# Patient Record
Sex: FEMALE | Race: WHITE | NOT HISPANIC OR LATINO | URBAN - METROPOLITAN AREA
[De-identification: names, ages, dates, MRNs, and addresses within clinical notes are randomized per-mention and may not be internally consistent; named-entity substitution may affect disease eponyms.]

---

## 2019-08-22 ENCOUNTER — INPATIENT (INPATIENT)
Facility: HOSPITAL | Age: 24
LOS: 0 days | Discharge: HOME | DRG: 101 | End: 2019-08-23
Attending: INTERNAL MEDICINE | Admitting: INTERNAL MEDICINE
Payer: COMMERCIAL

## 2019-08-22 VITALS
HEART RATE: 89 BPM | TEMPERATURE: 98 F | OXYGEN SATURATION: 96 % | WEIGHT: 149.91 LBS | SYSTOLIC BLOOD PRESSURE: 138 MMHG | RESPIRATION RATE: 18 BRPM | DIASTOLIC BLOOD PRESSURE: 89 MMHG

## 2019-08-22 LAB
ALBUMIN SERPL ELPH-MCNC: 4.7 G/DL — SIGNIFICANT CHANGE UP (ref 3.4–5)
ALP SERPL-CCNC: 74 U/L — SIGNIFICANT CHANGE UP (ref 40–120)
ALT FLD-CCNC: 48 U/L — HIGH (ref 12–42)
ANION GAP SERPL CALC-SCNC: 10 MMOL/L — SIGNIFICANT CHANGE UP (ref 9–16)
APPEARANCE UR: CLEAR — SIGNIFICANT CHANGE UP
AST SERPL-CCNC: 37 U/L — SIGNIFICANT CHANGE UP (ref 15–37)
BASOPHILS NFR BLD AUTO: 1 % — SIGNIFICANT CHANGE UP (ref 0–2)
BILIRUB SERPL-MCNC: 0.6 MG/DL — SIGNIFICANT CHANGE UP (ref 0.2–1.2)
BILIRUB UR-MCNC: NEGATIVE — SIGNIFICANT CHANGE UP
BUN SERPL-MCNC: 15 MG/DL — SIGNIFICANT CHANGE UP (ref 7–23)
CALCIUM SERPL-MCNC: 9.6 MG/DL — SIGNIFICANT CHANGE UP (ref 8.5–10.5)
CHLORIDE SERPL-SCNC: 104 MMOL/L — SIGNIFICANT CHANGE UP (ref 96–108)
CO2 SERPL-SCNC: 27 MMOL/L — SIGNIFICANT CHANGE UP (ref 22–31)
COLOR SPEC: YELLOW — SIGNIFICANT CHANGE UP
CREAT SERPL-MCNC: 0.89 MG/DL — SIGNIFICANT CHANGE UP (ref 0.5–1.3)
DIFF PNL FLD: NEGATIVE — SIGNIFICANT CHANGE UP
EOSINOPHIL NFR BLD AUTO: 1.3 % — SIGNIFICANT CHANGE UP (ref 0–6)
GLUCOSE SERPL-MCNC: 90 MG/DL — SIGNIFICANT CHANGE UP (ref 70–99)
GLUCOSE UR QL: NEGATIVE — SIGNIFICANT CHANGE UP
HCG UR QL: NEGATIVE — SIGNIFICANT CHANGE UP
HCT VFR BLD CALC: 41.5 % — SIGNIFICANT CHANGE UP (ref 34.5–45)
HGB BLD-MCNC: 14.6 G/DL — SIGNIFICANT CHANGE UP (ref 11.5–15.5)
IMM GRANULOCYTES NFR BLD AUTO: 0.3 % — SIGNIFICANT CHANGE UP (ref 0–1.5)
KETONES UR-MCNC: NEGATIVE — SIGNIFICANT CHANGE UP
LEUKOCYTE ESTERASE UR-ACNC: NEGATIVE — SIGNIFICANT CHANGE UP
LYMPHOCYTES # BLD AUTO: 29.8 % — SIGNIFICANT CHANGE UP (ref 13–44)
MCHC RBC-ENTMCNC: 34.1 PG — HIGH (ref 27–34)
MCHC RBC-ENTMCNC: 35.2 G/DL — SIGNIFICANT CHANGE UP (ref 32–36)
MCV RBC AUTO: 97 FL — SIGNIFICANT CHANGE UP (ref 80–100)
MONOCYTES NFR BLD AUTO: 9 % — SIGNIFICANT CHANGE UP (ref 2–14)
NEUTROPHILS NFR BLD AUTO: 58.6 % — SIGNIFICANT CHANGE UP (ref 43–77)
NITRITE UR-MCNC: NEGATIVE — SIGNIFICANT CHANGE UP
PCP SPEC-MCNC: SIGNIFICANT CHANGE UP
PH UR: 6 — SIGNIFICANT CHANGE UP (ref 5–8)
PLATELET # BLD AUTO: 235 K/UL — SIGNIFICANT CHANGE UP (ref 150–400)
POTASSIUM SERPL-MCNC: 3.4 MMOL/L — LOW (ref 3.5–5.3)
POTASSIUM SERPL-SCNC: 3.4 MMOL/L — LOW (ref 3.5–5.3)
PROT SERPL-MCNC: 8.1 G/DL — SIGNIFICANT CHANGE UP (ref 6.4–8.2)
PROT UR-MCNC: 30 MG/DL
RBC # BLD: 4.28 M/UL — SIGNIFICANT CHANGE UP (ref 3.8–5.2)
RBC # FLD: 11.9 % — SIGNIFICANT CHANGE UP (ref 10.3–14.5)
SODIUM SERPL-SCNC: 141 MMOL/L — SIGNIFICANT CHANGE UP (ref 132–145)
SP GR SPEC: 1.02 — SIGNIFICANT CHANGE UP (ref 1–1.03)
UROBILINOGEN FLD QL: 0.2 E.U./DL — SIGNIFICANT CHANGE UP
WBC # BLD: 6 K/UL — SIGNIFICANT CHANGE UP (ref 3.8–10.5)
WBC # FLD AUTO: 6 K/UL — SIGNIFICANT CHANGE UP (ref 3.8–10.5)

## 2019-08-22 PROCEDURE — 99285 EMERGENCY DEPT VISIT HI MDM: CPT

## 2019-08-22 PROCEDURE — 70450 CT HEAD/BRAIN W/O DYE: CPT | Mod: 26

## 2019-08-22 PROCEDURE — 93010 ELECTROCARDIOGRAM REPORT: CPT | Mod: NC

## 2019-08-22 RX ORDER — POTASSIUM CHLORIDE 20 MEQ
40 PACKET (EA) ORAL ONCE
Refills: 0 | Status: COMPLETED | OUTPATIENT
Start: 2019-08-22 | End: 2019-08-22

## 2019-08-22 RX ORDER — SODIUM CHLORIDE 9 MG/ML
1000 INJECTION INTRAMUSCULAR; INTRAVENOUS; SUBCUTANEOUS
Refills: 0 | Status: DISCONTINUED | OUTPATIENT
Start: 2019-08-22 | End: 2019-08-23

## 2019-08-22 RX ADMIN — Medication 50 MILLIGRAM(S): at 19:23

## 2019-08-22 RX ADMIN — Medication 0.5 MILLIGRAM(S): at 19:23

## 2019-08-22 RX ADMIN — Medication 40 MILLIEQUIVALENT(S): at 20:12

## 2019-08-22 RX ADMIN — Medication 0.5 MILLIGRAM(S): at 21:18

## 2019-08-22 RX ADMIN — SODIUM CHLORIDE 100 MILLILITER(S): 9 INJECTION INTRAMUSCULAR; INTRAVENOUS; SUBCUTANEOUS at 21:19

## 2019-08-22 NOTE — ED PROVIDER NOTE - OBJECTIVE STATEMENT
24 y/o Female with a PMHx of anxiety and depression on Zoloft presents the ED for a seizure. As per patient's sister who witnessed the seizure, pt was sitting on the couch at their cousin's house and started to lean and make groaning sounds, pt was red then turned blue. 911 was immediately called. Pt then sat up and looked confused with difficulty speaking for approximately 10 minutes. Pt's sister is not sure of how long the seizure lasted but states pt did not hit her head and was on the couch the entire time. Pt recently stopped taking Lorazepam 1mg once/day, which she takes for anxiety, and completed her tapering regimen (Lorazepam .5mg for 1-2 weeks) 4 days ago. Pt does not remember any thing but does remember feeling lightheaded and anxious prior to arriving her cousin's house. Of note, she has not been sleeping well for the last 2 days due to her job. She currently has a headache to the back of her head, feels anxious, and tongue pain. Denies urinary and bowel incontinence, visual and auditory hallucinations, and N/V/D, It was agreed by the patient and her psychiatrist to discontinue Lorazepam 2-3 weeks ago. She has done this in the past where she would take Lorazepam for 5 months and discontinue for a period time before starting Lorazepam again, she states feels long term use clouds her memory. Pt occasionally vapes and drinks approx 3x/week. When she drinks, she usually drinks 3 drinks but more on the weekends. Last alcoholic drink was yesterday evening where she drank 2 glasses of wine. Denies illicit drug use but does endorse eating a marijuana cookie 3 weeks ago. Pt denies a history of seizures. 24 y/o Female with a PMHx of anxiety and depression on Zoloft presents the ED for a seizure. As per patient's sister who witnessed the seizure, pt was sitting on the couch at their cousin's house and started to lean to one side and make groaning sounds, this was followed by GTC activity, pt was red then turned blue. 911 was immediately called. Pt then sat up and looked confused with difficulty speaking for approximately 10 minutes. Pt's sister is not sure of how long the seizure lasted but states pt did not hit her head and was on the couch the entire time. Pt recently stopped taking Lorazepam 1mg once/day, which she takes for anxiety, and completed her tapering regimen (Lorazepam .5mg for 1-2 weeks) 4 days ago.  Pt does not remember any thing but does remember feeling lightheaded and anxious prior to arriving her cousin's house. Of note, she has not been sleeping well for the last 2 days due to her job. She currently has a headache to the back of her head, feels anxious, and tongue pain. Denies urinary and bowel incontinence, visual and auditory hallucinations, and N/V/D, It was agreed by the patient and her psychiatrist to discontinue Lorazepam 2-3 weeks ago. She has done this in the past where she would take Lorazepam for 5 months and discontinue for a period time before starting Lorazepam again, she states feels long term use clouds her memory. Pt occasionally vapes and drinks approx 3x/week. When she drinks, she usually drinks 3 drinks but more on the weekends. Last alcoholic drink was yesterday evening where she drank 2 glasses of wine. Denies illicit drug use but does endorse eating a marijuana cookie 3 weeks ago. Pt denies a history of seizures.

## 2019-08-22 NOTE — ED PROVIDER NOTE - PHYSICAL EXAMINATION
VITAL SIGNS: I have reviewed nursing notes and confirm.  CONSTITUTIONAL: Well-developed; well-nourished; in no acute distress.  SKIN: Skin is warm and dry, no acute rash.  HEAD: Normocephalic; atraumatic.  EYES: PERRL, EOM intact; conjunctiva and sclera clear.  ENMT: No nasal discharge; airway clear. Bilateral tongue bites  NECK: Supple; non tender.  CARD: S1, S2 normal; no murmurs, gallops, or rubs. Regular rate and rhythm.  RESP: No wheezes, rales or rhonchi.  ABD: Normal bowel sounds; soft; non-distended; non-tender; no hepatosplenomegaly.  EXT: Normal ROM. No clubbing, cyanosis or edema.  NEURO: Alert, oriented. Grossly unremarkable. No pronator drift. PERRL. Speaking in full sentences.   PSYCH: Cooperative, appropriate. VITAL SIGNS: I have reviewed nursing notes and confirm.  CONSTITUTIONAL: Well-developed; well-nourished; in no acute distress.  SKIN: Skin is warm and dry, no acute rash.  HEAD: Normocephalic; atraumatic.  EYES: PERRL, EOM intact; conjunctiva and sclera clear.  ENMT: No nasal discharge; airway clear. Bilateral tongue bites and mild tongue fasciculations.   NECK: Supple; non tender.  CARD: S1, S2 normal; no murmurs, gallops, or rubs. Regular rate and rhythm.  RESP: No wheezes, rales or rhonchi.  ABD: Normal bowel sounds; soft; non-distended; non-tender; no hepatosplenomegaly.  EXT: Normal ROM. No clubbing, cyanosis or edema.  NEURO: Alert, oriented. Grossly unremarkable. No pronator drift. PERRL. Speaking in full sentences. Mild tremors.   PSYCH: Cooperative, anxious.

## 2019-08-22 NOTE — ED ADULT NURSE REASSESSMENT NOTE - NS ED NURSE REASSESS COMMENT FT1
Pt care handed over to myself, introduced self to patient and sister tia, pt gcs 15, calm and compliant, awaiting results of cat scan, sister states pt sitting on couch went rigid, fell to the side, bit her tongue and cheek "convulsing" lasting "about a minute", nil seizure activity in past, had been taking 1mg po lorazepam for 5 minths daily stopped abruptly 1 week ago, increased alcohol intake over past week

## 2019-08-22 NOTE — ED PROVIDER NOTE - CARE PLAN
Principal Discharge DX:	New onset seizure Principal Discharge DX:	New onset seizure  Secondary Diagnosis:	Benzodiazepine withdrawal

## 2019-08-22 NOTE — ED ADULT NURSE NOTE - NSIMPLEMENTINTERV_GEN_ALL_ED
Implemented All Universal Safety Interventions:  [place] to call system. Call bell, personal items and telephone within reach. Instruct patient to call for assistance. Room bathroom lighting operational. Non-slip footwear when patient is off stretcher. Physically safe environment: no spills, clutter or unnecessary equipment. Stretcher in lowest position, wheels locked, appropriate side rails in place.

## 2019-08-22 NOTE — ED PROVIDER NOTE - PROGRESS NOTE DETAILS
Pt is feeling better.  Current CIWA is ~7.  Will consult MICU at [facility] given high risk dx of benzo w/d.  Will admit as per their recommendations. Case discussed with Dr. bowles of MICU.  He accepts pt to medical step down for close monitoring overnight.  Recommends additional ativan and maintenance fluid.

## 2019-08-22 NOTE — ED ADULT NURSE NOTE - OBJECTIVE STATEMENT
witnessed seizure activity (first) while sitting on the sofa at a families member home, no injuries, pt tearful, no s/s of distress, sister at bedside

## 2019-08-22 NOTE — ED PROVIDER NOTE - CLINICAL SUMMARY MEDICAL DECISION MAKING FREE TEXT BOX
24 y/o Female with a PMHx of depression and anxiety on Zoloft here for new onset of seizure witnessed by her sister. Pt recently discontinued Lorazepam 4 days ago which was agreed upon by pt and her psychiatrist. Will order labs, head CT, IV Lorazepam, Librium, and EKG. Will reassess. 24 y/o Female with a PMHx of depression and anxiety on Zoloft here for new onset of seizure witnessed by her sister. Pt recently discontinued Lorazepam 4 days ago which was agreed upon by pt and her psychiatrist. Will order labs, head CT, IV Lorazepam, Librium, and EKG. Will reassess.  Initial CIWA is ~11.

## 2019-08-22 NOTE — ED ADULT TRIAGE NOTE - CHIEF COMPLAINT QUOTE
here for witnessed seizure with no injury. Pt admits to recently stopping her lorazepam which she has been taking for the last 5 months. Pt arrives A+Ox3 and also admits to drinking frequently. Pt with h/o anxiety denies any seizure history

## 2019-08-23 ENCOUNTER — TRANSCRIPTION ENCOUNTER (OUTPATIENT)
Age: 24
End: 2019-08-23

## 2019-08-23 VITALS — TEMPERATURE: 98 F

## 2019-08-23 DIAGNOSIS — F10.239 ALCOHOL DEPENDENCE WITH WITHDRAWAL, UNSPECIFIED: ICD-10-CM

## 2019-08-23 DIAGNOSIS — K08.409 PARTIAL LOSS OF TEETH, UNSPECIFIED CAUSE, UNSPECIFIED CLASS: Chronic | ICD-10-CM

## 2019-08-23 DIAGNOSIS — Z91.89 OTHER SPECIFIED PERSONAL RISK FACTORS, NOT ELSEWHERE CLASSIFIED: ICD-10-CM

## 2019-08-23 DIAGNOSIS — F41.9 ANXIETY DISORDER, UNSPECIFIED: ICD-10-CM

## 2019-08-23 DIAGNOSIS — R56.9 UNSPECIFIED CONVULSIONS: ICD-10-CM

## 2019-08-23 DIAGNOSIS — R63.8 OTHER SYMPTOMS AND SIGNS CONCERNING FOOD AND FLUID INTAKE: ICD-10-CM

## 2019-08-23 DIAGNOSIS — F32.9 MAJOR DEPRESSIVE DISORDER, SINGLE EPISODE, UNSPECIFIED: ICD-10-CM

## 2019-08-23 PROBLEM — Z00.00 ENCOUNTER FOR PREVENTIVE HEALTH EXAMINATION: Status: ACTIVE | Noted: 2019-08-23

## 2019-08-23 LAB
ANION GAP SERPL CALC-SCNC: 9 MMOL/L — SIGNIFICANT CHANGE UP (ref 5–17)
BUN SERPL-MCNC: 13 MG/DL — SIGNIFICANT CHANGE UP (ref 7–23)
CALCIUM SERPL-MCNC: 8.3 MG/DL — LOW (ref 8.4–10.5)
CHLORIDE SERPL-SCNC: 108 MMOL/L — SIGNIFICANT CHANGE UP (ref 96–108)
CO2 SERPL-SCNC: 23 MMOL/L — SIGNIFICANT CHANGE UP (ref 22–31)
CREAT SERPL-MCNC: 0.84 MG/DL — SIGNIFICANT CHANGE UP (ref 0.5–1.3)
GLUCOSE SERPL-MCNC: 89 MG/DL — SIGNIFICANT CHANGE UP (ref 70–99)
HCT VFR BLD CALC: 38.7 % — SIGNIFICANT CHANGE UP (ref 34.5–45)
HGB BLD-MCNC: 12.7 G/DL — SIGNIFICANT CHANGE UP (ref 11.5–15.5)
MAGNESIUM SERPL-MCNC: 1.9 MG/DL — SIGNIFICANT CHANGE UP (ref 1.6–2.6)
MCHC RBC-ENTMCNC: 32.8 GM/DL — SIGNIFICANT CHANGE UP (ref 32–36)
MCHC RBC-ENTMCNC: 33.4 PG — SIGNIFICANT CHANGE UP (ref 27–34)
MCV RBC AUTO: 101.8 FL — HIGH (ref 80–100)
NRBC # BLD: 0 /100 WBCS — SIGNIFICANT CHANGE UP (ref 0–0)
PLATELET # BLD AUTO: 211 K/UL — SIGNIFICANT CHANGE UP (ref 150–400)
POTASSIUM SERPL-MCNC: 4.1 MMOL/L — SIGNIFICANT CHANGE UP (ref 3.5–5.3)
POTASSIUM SERPL-SCNC: 4.1 MMOL/L — SIGNIFICANT CHANGE UP (ref 3.5–5.3)
RBC # BLD: 3.8 M/UL — SIGNIFICANT CHANGE UP (ref 3.8–5.2)
RBC # FLD: 12 % — SIGNIFICANT CHANGE UP (ref 10.3–14.5)
SODIUM SERPL-SCNC: 140 MMOL/L — SIGNIFICANT CHANGE UP (ref 135–145)
WBC # BLD: 8.87 K/UL — SIGNIFICANT CHANGE UP (ref 3.8–10.5)
WBC # FLD AUTO: 8.87 K/UL — SIGNIFICANT CHANGE UP (ref 3.8–10.5)

## 2019-08-23 PROCEDURE — 99222 1ST HOSP IP/OBS MODERATE 55: CPT

## 2019-08-23 PROCEDURE — 85027 COMPLETE CBC AUTOMATED: CPT

## 2019-08-23 PROCEDURE — 82962 GLUCOSE BLOOD TEST: CPT

## 2019-08-23 PROCEDURE — 80307 DRUG TEST PRSMV CHEM ANLYZR: CPT

## 2019-08-23 PROCEDURE — 85025 COMPLETE CBC W/AUTO DIFF WBC: CPT

## 2019-08-23 PROCEDURE — 99285 EMERGENCY DEPT VISIT HI MDM: CPT | Mod: 25

## 2019-08-23 PROCEDURE — 80048 BASIC METABOLIC PNL TOTAL CA: CPT

## 2019-08-23 PROCEDURE — 96374 THER/PROPH/DIAG INJ IV PUSH: CPT

## 2019-08-23 PROCEDURE — G0378: CPT

## 2019-08-23 PROCEDURE — 83735 ASSAY OF MAGNESIUM: CPT

## 2019-08-23 PROCEDURE — 93005 ELECTROCARDIOGRAM TRACING: CPT

## 2019-08-23 PROCEDURE — 96376 TX/PRO/DX INJ SAME DRUG ADON: CPT

## 2019-08-23 PROCEDURE — 70450 CT HEAD/BRAIN W/O DYE: CPT

## 2019-08-23 PROCEDURE — 95813 EEG EXTND MNTR 61-119 MIN: CPT | Mod: 26

## 2019-08-23 PROCEDURE — 81025 URINE PREGNANCY TEST: CPT

## 2019-08-23 PROCEDURE — 80053 COMPREHEN METABOLIC PANEL: CPT

## 2019-08-23 PROCEDURE — 36415 COLL VENOUS BLD VENIPUNCTURE: CPT

## 2019-08-23 PROCEDURE — 81001 URINALYSIS AUTO W/SCOPE: CPT

## 2019-08-23 PROCEDURE — 95819 EEG AWAKE AND ASLEEP: CPT

## 2019-08-23 RX ORDER — THIAMINE MONONITRATE (VIT B1) 100 MG
1 TABLET ORAL
Qty: 30 | Refills: 0
Start: 2019-08-23 | End: 2019-09-21

## 2019-08-23 RX ORDER — THIAMINE MONONITRATE (VIT B1) 100 MG
1 TABLET ORAL
Qty: 0 | Refills: 0 | DISCHARGE
Start: 2019-08-23

## 2019-08-23 RX ORDER — FOLIC ACID 0.8 MG
1 TABLET ORAL
Qty: 30 | Refills: 0
Start: 2019-08-23 | End: 2019-09-21

## 2019-08-23 RX ORDER — FOLIC ACID 0.8 MG
1 TABLET ORAL DAILY
Refills: 0 | Status: DISCONTINUED | OUTPATIENT
Start: 2019-08-23 | End: 2019-08-23

## 2019-08-23 RX ORDER — SERTRALINE 25 MG/1
150 TABLET, FILM COATED ORAL
Qty: 0 | Refills: 0 | DISCHARGE

## 2019-08-23 RX ORDER — ACETAMINOPHEN 500 MG
650 TABLET ORAL EVERY 6 HOURS
Refills: 0 | Status: DISCONTINUED | OUTPATIENT
Start: 2019-08-23 | End: 2019-08-23

## 2019-08-23 RX ORDER — SODIUM CHLORIDE 9 MG/ML
1000 INJECTION INTRAMUSCULAR; INTRAVENOUS; SUBCUTANEOUS
Refills: 0 | Status: DISCONTINUED | OUTPATIENT
Start: 2019-08-23 | End: 2019-08-23

## 2019-08-23 RX ORDER — THIAMINE MONONITRATE (VIT B1) 100 MG
100 TABLET ORAL DAILY
Refills: 0 | Status: DISCONTINUED | OUTPATIENT
Start: 2019-08-23 | End: 2019-08-23

## 2019-08-23 RX ORDER — FOLIC ACID 0.8 MG
1 TABLET ORAL
Qty: 0 | Refills: 0 | DISCHARGE
Start: 2019-08-23

## 2019-08-23 RX ADMIN — SODIUM CHLORIDE 100 MILLILITER(S): 9 INJECTION INTRAMUSCULAR; INTRAVENOUS; SUBCUTANEOUS at 10:57

## 2019-08-23 RX ADMIN — Medication 1 MILLIGRAM(S): at 12:06

## 2019-08-23 RX ADMIN — Medication 650 MILLIGRAM(S): at 00:25

## 2019-08-23 RX ADMIN — Medication 650 MILLIGRAM(S): at 13:32

## 2019-08-23 RX ADMIN — Medication 1 TABLET(S): at 12:06

## 2019-08-23 RX ADMIN — Medication 0.5 MILLIGRAM(S): at 12:06

## 2019-08-23 RX ADMIN — Medication 650 MILLIGRAM(S): at 01:00

## 2019-08-23 RX ADMIN — Medication 650 MILLIGRAM(S): at 13:02

## 2019-08-23 RX ADMIN — Medication 100 MILLIGRAM(S): at 12:06

## 2019-08-23 NOTE — PROGRESS NOTE ADULT - SUBJECTIVE AND OBJECTIVE BOX
*** INCOMPLETE ***    OVERNIGHT EVENTS: As per overnight staff, there were no acute events overnight    SUBJECTIVE / INTERVAL HPI: Patient seen and examined at bedside. Patient resting comfortably, no complaints at this time. Patient denies: fever, chills, weakness, dizziness, headaches, changes in vision, chest pain, palpitations, shortness of breath, cough, N/V, diarrhea or constipation, dysuria, LE edema. ROS otherwise negative.      VITAL SIGNS:  Vital Signs Last 24 Hrs  T(C): 36.4 (23 Aug 2019 10:40), Max: 36.9 (22 Aug 2019 18:43)  T(F): 97.6 (23 Aug 2019 10:40), Max: 98.5 (22 Aug 2019 18:43)  HR: 54 (23 Aug 2019 08:46) (50 - 89)  BP: 91/53 (23 Aug 2019 08:46) (91/53 - 138/89)  BP(mean): 66 (23 Aug 2019 08:46) (66 - 88)  RR: 17 (23 Aug 2019 08:46) (16 - 18)  SpO2: 98% (23 Aug 2019 08:46) (96% - 100%)      19 @ 07:01  -  19 @ 11:22  --------------------------------------------------------  IN: 400 mL / OUT: 0 mL / NET: 400 mL      PHYSICAL EXAM:  General: well-nourished, comfortable in bed, NAD  Neurological: AAOx3, no focal deficits, answering questions appropriately  HEENT: NC/AT; EOMI, anicteric sclera, no discharge or exudate from eyes or nose; MMM  Neck: supple  Cardiovascular: +S1/S2, no murmurs/rubs/gallops, RRR  Respiratory: no increased work of breathing, CTA B/L, no diminished breath sounds, no accessory muscle use  Gastrointestinal: soft, NT/ND; active BSx4 quadrants  Genitourinary: no suprapubic tenderness, no CVA tenderness  Extremities: WWP; no edema  Skin: multiple well-healed tattoos      MEDICATIONS:  MEDICATIONS  (STANDING):  folic acid 1 milliGRAM(s) Oral daily  LORazepam     Tablet 0.5 milliGRAM(s) Oral daily  multivitamin 1 Tablet(s) Oral daily  sodium chloride 0.9%. 1000 milliLiter(s) (100 mL/Hr) IV Continuous <Continuous>  thiamine 100 milliGRAM(s) Oral daily    MEDICATIONS  (PRN):  acetaminophen    Suspension .. 650 milliGRAM(s) Oral every 6 hours PRN Mild Pain (1 - 3)  LORazepam   Injectable 1 milliGRAM(s) IV Push every 4 hours PRN seizure      ALLERGIES/INTOLERANCES:  Allergies  No Known Allergies    Intolerances      LABS:                        12.7   8.87  )-----------( 211      ( 23 Aug 2019 05:36 )             38.7     08-    140  |  108  |  13  ----------------------------<  89  4.1   |  23  |  0.84    Ca    8.3<L>      23 Aug 2019 05:36  Mg     1.9         TPro  8.1  /  Alb  4.7  /  TBili  0.6  /  DBili  x   /  AST  37  /  ALT  48<H>  /  AlkPhos  74        Urinalysis Basic - ( 22 Aug 2019 19:45 )    Color: Yellow / Appearance: Clear / S.025 / pH: x  Gluc: x / Ketone: NEGATIVE  / Bili: NEGATIVE / Urobili: 0.2 E.U./dL   Blood: x / Protein: 30 mg/dL / Nitrite: NEGATIVE   Leuk Esterase: NEGATIVE / RBC: < 5 /HPF / WBC < 5 /HPF   Sq Epi: x / Non Sq Epi: 5-10 /HPF / Bacteria: Present /HPF      CAPILLARY BLOOD GLUCOSE      POCT Blood Glucose.: 121 mg/dL (22 Aug 2019 18:53)          RADIOLOGY & ADDITIONAL TESTS: Reviewed.

## 2019-08-23 NOTE — DISCHARGE NOTE PROVIDER - HOSPITAL COURSE
Patient is 22 yo F with past medical history of anxiety (on Lorazepam), depression (on Zoloft), and EtOH (6drinks/day on weekends, 2drinks/day for 2 nights of the week).    Presented to [facility] after a witnessed tonic-clonic seizure that lasted 5 minutes (10min post-ictal state).        Problem List/Main Diagnoses (system-based):         - seizure: tonic-clonic seizure occurred prior to presentation at [facility]. Given ativan and librium at [facility] before transfer to [facility]. CT head in [facility] negative. Started on 0.5 ativan qd to be continued after discharge pending follow up with neurology and psychiatry. Placed on vEEG for ___ hrs prior to discharge.        Inpatient treatment course: In [facility], VS wnl, CIWA 11. Given ativan 1mg, repeat CIWA 7. Additional ativan 1mg and librium 50mg given. CT head negative. Transferred to [facility] for post-seizure monitoring. At [facility] CIWA 0.        New medications: none        Labs to be followed outpatient: none    Exam to be followed outpatient: EEG monitoring        On the day of discharge, the patient was seen and examined. Symptoms improved. Vital signs are stable. Labs and imaging reviewed. Patient is medically optimized and hemodynamically stable. Return precautions discussed, medication teach back done, and importance of physician followup emphasized. The patient verbalized understanding. Patient is 22 yo F with past medical history of anxiety (on Lorazepam), depression (on Zoloft), and EtOH (6drinks/day on weekends, 2drinks/day for 2 nights of the week).    Presented to [facility] after a witnessed tonic-clonic seizure that lasted 5 minutes (10min post-ictal state).        Problem List/Main Diagnoses (system-based):         - seizure: tonic-clonic seizure occurred prior to presentation at [facility]. Given ativan and librium at [facility] before transfer to [facility]. CT head in [facility] negative. Started on 0.5 ativan qd to be continued after discharge pending follow up with neurology and psychiatry. Placed on vEEG prior to discharge.        Inpatient treatment course: In [facility], VS wnl, CIWA 11. Given ativan 1mg, repeat CIWA 7. Additional ativan 1mg and librium 50mg given. CT head negative. Transferred to [facility] for post-seizure monitoring. At [facility] CIWA 0.        New medications: none        Labs to be followed outpatient: none    Exam to be followed outpatient: EEG monitoring        On the day of discharge, the patient was seen and examined. Symptoms improved. Vital signs are stable. Labs and imaging reviewed. Patient is medically optimized and hemodynamically stable. Return precautions discussed, medication teach back done, and importance of physician followup emphasized. The patient verbalized understanding.

## 2019-08-23 NOTE — DISCHARGE NOTE NURSING/CASE MANAGEMENT/SOCIAL WORK - NSDCDPATPORTLINK_GEN_ALL_CORE
You can access the [facility] Patient Portal, offered by [facility], by registering with the following website: [email or web address]

## 2019-08-23 NOTE — H&P ADULT - PROBLEM SELECTOR PLAN 3
Patient with history of anxiety. 3 years ago was started on Lorazepam PRN for several months. Restarted Lorazepam this year again however decided to slowly taper off. Last dose of Lorazepam was 4 days ago (8/19).   - continue to follow up as outpatient with psychiatrist

## 2019-08-23 NOTE — DISCHARGE NOTE PROVIDER - NSDCFUSCHEDAPPT_GEN_ALL_CORE_FT
TASHA SILVA ; 08/27/2019 ; NPP Neuro [address]  TASHA SILVA ; 09/05/2019 ; NPP Med GenInt [address]

## 2019-08-23 NOTE — H&P ADULT - PROBLEM SELECTOR PLAN 5
F: 100cc/hr NS  E: replete electrolytes K< 4, Mg <2  N: regular diet   DVT PPx: none, Padua score = 0  Code status: Full Code

## 2019-08-23 NOTE — CONSULT NOTE ADULT - SUBJECTIVE AND OBJECTIVE BOX
HPI  22yo F with a PMHx of anxiety (on Lorazepam), depression (on Zoloft), and EtOH abuse who presented to the ED after a witnessed seizure. Pt states that she was at her baseline when she woke up on Thursday morning, 8/22/19. She went to work and then went to her cousin's house. Approximately at 6pm she stated that she felt strange and then had a witnessed tonic clonic seizure which lasted for approx 5 min with a 10 min post ictal period. She endorses tongue biting (denies blood in her mouth). Denies fecal/urinary incontinence, LOC or hitting her head. Pt does not recall the details of her seizure; details of seizure were confirmed by her sister who witnessed the seizure and was at bedside during the interview. Pt's sister states that pt was on the couch, leaned to one side, with tonic clonic movements in her bilateral upper and lower extremities, during which her face turned blue. She did not hit her head during the seizure. Pt was post ictal for approximately 10 min (confusion and word finding difficulties). EMS was called immediately and pt was brought to [facility]. Of note, pt had been taking Lorazepam 0.5 - 1mg PRN at bedtime for the past 5 months, but recently stopped and started a self taper. Her last dose of Lorazepam 0.5 mg was Wed 8/21. She endorses regular EtOH use. She drinks 6-8 glasses of wine each weekend night and approx 2 glasses of wine, 2 weekday nights. Her last alcoholic drink was at 7pm on Wednesday 8/21. She vapes and endorses occasional marijuana use. She does not have a seizure hx and has not had a seizure in the past. Pt transferred to [address] [facility] for post seizure monitoring.    Epilepsy consulted for seizure episode yesterday. Patient endorse today that the she started taking ativan 1mg/day 5 months ago after the loss of her boyfriend. The first month she took 0.5mg BID, followed by 2 consecutive months of 1mg at bedtime, and for the past 2 months was self-tapering. Most recent dose was 0.5mg daily, and patient states the last dose was on Monday but the daily regimen was over a week ago.  Patient states that she is a , and has been working 17hrs/day for the past week with inadequate amount of sleep <5hrs. Patient denies any identifiable risk factors such as family hx, febrile seizures, meningitis or encephalitis. Of note, patient's CIWA score was 11 yesterday, and 0 today.     Epilepsy risk factors:  Head injury with subsequent LOC?: Denies  Febrile seizures in infancy?: Denies  Hx of CNS infection?: Denies  Family hx of epilepsy?: Denies  Known CNS pathology?: Denies     Review of Systems:  CONSTITUTIONAL:  No weight loss, fever, chills, weakness or fatigue.  HEENT:  Eyes:  No visual loss, blurred vision, double vision or yellow sclerae. Ears, Nose, Throat:  No hearing loss, sneezing, congestion, runny nose or sore throat.  SKIN:  No rash or itching.  CARDIOVASCULAR:  No chest pain, chest pressure or chest discomfort. No palpitations or edema.  RESPIRATORY:  No shortness of breath, cough or sputum.  GASTROINTESTINAL:  No anorexia, nausea, vomiting or diarrhea. No abdominal pain or blood.  GENITOURINARY: No Burning on urination.   NEUROLOGICAL:  see HPI  MUSCULOSKELETAL:  No muscle, back pain, joint pain or stiffness.  HEMATOLOGIC:  No anemia, bleeding or bruising.  LYMPHATICS:  No enlarged nodes. No history of splenectomy.  PSYCHIATRIC:  No history of depression or anxiety.  ENDOCRINOLOGIC:  No reports of sweating, cold or heat intolerance. No polyuria or polydipsia.  ALLERGIES:  No history of asthma, hives, eczema or rhinitis.       PAST MEDICAL & SURGICAL HISTORY:  Anxiety  Depression  [place] teeth removed       FAMILY HISTORY:  Family history of bladder cancer: father       Social History:  Alcohol, illicits, smoking?: Alcohol intake 2glasses of wine 3*/week, and 6 glasses on the weekend. Vape and marijuana smoking occasionally.   Driving?: Yes  Occupation:       Allergies  No Known Allergies       MEDICATIONS  (STANDING):  folic acid 1 milliGRAM(s) Oral daily  LORazepam     Tablet 0.5 milliGRAM(s) Oral daily  multivitamin 1 Tablet(s) Oral daily  sodium chloride 0.9%. 1000 milliLiter(s) (100 mL/Hr) IV Continuous <Continuous>  thiamine 100 milliGRAM(s) Oral daily    MEDICATIONS  (PRN):  acetaminophen    Suspension .. 650 milliGRAM(s) Oral every 6 hours PRN Mild Pain (1 - 3)  LORazepam   Injectable 1 milliGRAM(s) IV Push every 4 hours PRN seizure       T(C): 36.4 (08-23-19 @ 10:40), Max: 36.9 (08-22-19 @ 18:43)  HR: 68 (08-23-19 @ 12:07) (50 - 89)  BP: 114/72 (08-23-19 @ 12:07) (91/53 - 138/89)  RR: 22 (08-23-19 @ 12:07) (16 - 22)  SpO2: 100% (08-23-19 @ 12:07) (96% - 100%)  Wt(kg): --    General:  Constitutional:  Well appearing young woman  lying comfortably in NAD.  Psychiatric: calm, normal affect, no overt anxiety or internal preoccupation  Ears, Nose, Throat: no abnormalities, mucus membranes moist  Neck: supple, no lymphadenopathy or nodules palpable  Cardiovascular: regular rate and rhythm, normal S1/S2, no murmurs   Chest: Clear to bases. 	  Abdomen: soft, non-tender, no hepatosplenomegaly   Extremities: no edema, clubbing or cyanosis  Skin: no rash or neurocutaneous signs     Cognitive:  Alert and orientated *3, language, memory and knowledge screens intact.  Cranial Nerves:  II: Full to confrontation. III/IV/VI: PERRLA 3mm brisk EOMF No nystagmus  V1V2V3: Symmetric, VII: Face appears symmetric VIII: Normal to screening, IX/X: Palate Elevates Symmetrical  XI: Trapezius Symmetric  XII: Tongue midline  Motor:  Power: 5/5 throughout, tone: normal x 4 limbs, no tremor   Sensation:  Intact to light touch.  Coordination/Gait:  Finger-nose-finger intact, normal rapid-alternating movements.  Reflexes:  DTR: 2+ symmetric all 4 limbs, no clonus  Plantar responses: Down bilaterally      Labs  CBC Full  -  ( 23 Aug 2019 05:36 )  WBC Count : 8.87 K/uL  RBC Count : 3.80 M/uL  Hemoglobin : 12.7 g/dL  Hematocrit : 38.7 %  Platelet Count - Automated : 211 K/uL  Mean Cell Volume : 101.8 fl  Mean Cell Hemoglobin : 33.4 pg  Mean Cell Hemoglobin Concentration : 32.8 gm/dL  Auto Neutrophil # : x  Auto Lymphocyte # : x  Auto Monocyte # : x  Auto Eosinophil # : x  Auto Basophil # : x  Auto Neutrophil % : x  Auto Lymphocyte % : x  Auto Monocyte % : x  Auto Eosinophil % : x  Auto Basophil % : x    08-23    140  |  108  |  13  ----------------------------<  89  4.1   |  23  |  0.84    Ca    8.3<L>      23 Aug 2019 05:36  Mg     1.9     08-23    TPro  8.1  /  Alb  4.7  /  TBili  0.6  /  DBili  x   /  AST  37  /  ALT  48<H>  /  AlkPhos  74  08-22    LIVER FUNCTIONS - ( 22 Aug 2019 19:27 )  Alb: 4.7 g/dL / Pro: 8.1 g/dL / ALK PHOS: 74 U/L / ALT: 48 U/L / AST: 37 U/L / GGT: x             < from: CT Head No Cont (08.22.19 @ 19:48) >    IMPRESSION:    No acute findings.    < end of copied text >

## 2019-08-23 NOTE — DISCHARGE NOTE NURSING/CASE MANAGEMENT/SOCIAL WORK - NSDCFUADDAPPT_GEN_ALL_CORE_FT
You have an appointment with Dr. Do (neurology) to follow up after your recent seizure. Your appointment is scheduled for: 8/27/19 at 3:00pm.    You have an appointment with [facility] to follow up after your discharge and to initiate health care management. Your appointment is scheduled for: 9/5/19 at 10:00am.

## 2019-08-23 NOTE — H&P ADULT - PROBLEM SELECTOR PLAN 1
Patient presented with witnessed GTC seizure most likely 2/2 benzodiazepine taper and EtOH abuse. No prior seizure history. Now AAOx3 and CIWA at zero.  - s/p Librium 50 mg, Ativan 0.5 mg x2 at [facility] for CIWA = 11, repeat CIWA 7  - Currently AAOx3, CIWA 0  - Ativan 1mg q4hrs PRN for seizures  - c/w tele monitoring overnight  - c/w NS at 100 cc/hr Patient presented with witnessed GTC seizure most likely 2/2 benzodiazepine taper and EtOH abuse. No prior seizure history. Now AAOx3 and CIWA at zero.  - [facility] Librium 50 mg[facility], repeat CIWA 7  - CT head negative for hemorrhage, intracranial mass or skull fracture   - Urine tox negative   - Currently AAOx3, CIWA 0  - Ativan 1mg q4hrs PRN for seizures  - c/w tele monitoring overnight  - c/w NS at 100 cc/hr

## 2019-08-23 NOTE — H&P ADULT - HISTORY OF PRESENT ILLNESS
24yo F with a PMHx of anxiety (on Lorazepam), depression (on Zoloft), and EtOH abuse who presented to the ED after a witnessed seizure. Pt states that she was at her baseline when she woke up on Thursday morning, 8/22/19. She went to work and then went to her cousin's house. Approximately at 6pm she stated that she felt strange and then had a witnessed tonic clonic seizure which lasted for approx 5 min with a 10 min post ictal period. She endorses tongue biting (denies blood in her mouth). Denies fecal/urinary incontinence, LOC or hitting her head. Pt does not recall the details of her seizure; details of seizure were confirmed by her sister who witnessed the seizure and was at bedside during the interview. Pt's sister states that pt was on the couch, leaned to one side, with tonic clonic movements in her bilateral upper and lower extremities, during which her face turned blue. She did not hit her head during the seizure. Pt was post ictal for approximately 10 min (confusion and word finding difficulties). EMS was called immediately and pt was brought to [facility]. Of note, pt had been taking Lorazepam 0.5 - 1mg PRN at bedtime for the past 5 months, but recently stopped and started a self taper. Her last dose of Lorazepam 0.5 mg was Wed 8/21. She endorses regular EtOH use. She drinks 6-8 glasses of wine each weekend night and approx 2 glasses of wine, 2 weekday nights. Her last alcoholic drink was at 7pm on Wednesday 8/21. She vapes and endorses occasional marijuana use. She does not have a seizure hx and has not had a seizure in the past. Pt transferred to [facility] for post seizure monitoring.    [facility] vitals: T: 98.5, HR: 89, BP: 138/89, RR: 18, SpO2: 96% RA, CIWA 11, repeat CIWA 7 s/p Ativan 1mg  [facility] meds: Librium 50 mg, Ativan 0.5 mg x2  [facility] CT head negative

## 2019-08-23 NOTE — DISCHARGE NOTE PROVIDER - CARE PROVIDERS DIRECT ADDRESSES
[email or web address]

## 2019-08-23 NOTE — DISCHARGE NOTE PROVIDER - NSDCCPCAREPLAN_GEN_ALL_CORE_FT
PRINCIPAL DISCHARGE DIAGNOSIS  Diagnosis: New onset seizure  Assessment and Plan of Treatment: You presented to the hospital after a witnessed seizure. Seizures are caused by abnormal electrical activity in the brain. Your seizure was likely caused by a combination of lorazepam withdrawal and alcohol withdrawal. Lorazepam can reduce the seizure threshold, making a person more susceptible to seizures. This, in combination with alcohol use/withdrawal is likely the cause of your seizure. While in the hospital you were continued on lorazepam 0.5mg every day. This is the same dose you were taking prior to admission. A video EEG was placed to monitor brain activity and assess if there may be an underlying neurological disorder to explain your seizure.    -Please continue to take lorazepam 0.5mg every day.    -Please follow up with neurology after discharge to discuss the results of your EEG and assess whether additional testing is needed.    -Please follow up with your psychiatrist after discharge to discuss a safe plan for tapering your lorazepam.

## 2019-08-23 NOTE — EEG REPORT - NS EEG TEXT BOX
[facility] Department of Neurology  Inpatient Routine-Electroencephalography Report    Patient Name:	TASHA SILVA    :	1995  MRN:	[id number]    Study Date/Time:  2019, 12:12:41 PM  Referred by:  Akila Do MD    Brief Clinical History:  TASHA SILVA is a 23 year old Female; study performed to investigate for seizures or markers of epilepsy.    Diagnosis Code: R56.9 convulsions/seizure  CPT:  27751 (>1Hr)    Pertinent Medications    Acquisition Details:  Electroencephalography was acquired using a minimum of 21 channels on an [employer] Neu[employer] system v 8.5.1 with electrode placement according to the standard International 10-20 system following ACNS (American Clinical Neurophysiology Society) guidelines.  Anterior temporal T1 and T2 electrodes were utilized whenever possible.   The [employer]TEK automated spike & seizure detections were all reviewed in detail, in addition to the entire raw EEG.    Findings:  Background:  continuous, symmetric and reactive, with predominantly alpha and beta frequencies.  Organization: normal anterior-posterior voltage-frequency gradient.  Posterior Dominant Rhythm: symmetric, well-regulated 9 Hz.  N2 sleep: symmetric, synchronous sleep spindles/K-complexes..  Variability/Reactivity: present.    Focal abnormalities:    1)	No persistent asymmetries of voltage or frequency.  Spontaneous Activity:  1)	No epileptiform discharges.  Events:  1)	No electrographic seizures or clinical events.  Provocations:  1)	Hyperventilation:  did not evoke EEG abnormalities.  2)	Photic stimulation: did not evoke EEG abnormalities.    Summary:  Normal  inpatient routine EEG study, awake and asleep.  1)	The EEG remained normal throughout the study      Clinical Correlation:  There were no findings of active epilepsy, however this alone does not rule out the diagnosis.       Akila Do MD  Attending Neurologist, Division of Epilepsy

## 2019-08-23 NOTE — DISCHARGE NOTE PROVIDER - CARE PROVIDER_API CALL
Akila Do)  Neurology  [address], [address] [place] [address]  Phone: [phone]  Fax: [phone]  Follow Up Time:

## 2019-08-23 NOTE — H&P ADULT - NSHPPHYSICALEXAM_GEN_ALL_CORE
PHYSICAL EXAM:  GENERAL: NAD, well-groomed, well-developed. In no acute distress.   HEAD:  Atraumatic, Normocephalic  EYES: EOMI, PERRLA, conjunctiva and sclera clear  ENMT: No tonsillar erythema, exudates, or enlargement; Moist mucous membranes. trace B/l tongue lacerations  NECK: Supple, No JVD, Normal thyroid  HEART: Regular rate and rhythm; No murmurs, rubs, or gallops  RESPIRATORY: CTA B/L, No W/R/R  ABDOMEN: Soft, Nontender, Nondistended; Bowel sounds present  EXTREMITIES:  2+ Peripheral Pulses, No clubbing, cyanosis, or edema  SKIN: warm, dry, normal color, no rash or abnormal lesions  Neurological:   	- Mental Status: AAOx3; speech is fluent with intact naming, repetition, and comprehension  	- Cranial Nerves II -XII:  II: PERRLA; visual fields are full to confrontation  III, IV, VI: EOMI, no nystagmus appreciated  V: facial sensation intact to light touch V1-V3 b/l  VII: no ptosis, no facial droop, symmetric eyebrow raise and closure  VIII: hearing intact to finger rub b/l  IX, X: uvula is midline, soft palate rises symmetrically  XI: head turning and shoulder shrug intact b/l  XII: tongue protrusion midline  - Motor: strength is 5/5 b/l LLE & RLE, 5/5 b/l LUE & RUE. normal muscle bulk and tone throughout, no pronator drift  -Sensation: Intact to light touch, proprioception, and pinprick.  No neglect.   -Coordination: No dysmetria on finger-to-nose and heel-to-shin.  No clumsiness.  -Reflexes: 2+ in upper and lower extremities, downgoing toes bilaterally  -Gait: not assessed

## 2019-08-23 NOTE — H&P ADULT - NSHPLABSRESULTS_GEN_ALL_CORE
.  LABS:                         14.6   6.0   )-----------( 235      ( 22 Aug 2019 19:27 )             41.5     -    141  |  104  |  15  ----------------------------<  90  3.4<L>   |  27  |  0.89    Ca    9.6      22 Aug 2019 19:27    TPro  8.1  /  Alb  4.7  /  TBili  0.6  /  DBili  x   /  AST  37  /  ALT  48<H>  /  AlkPhos  74        Urinalysis Basic - ( 22 Aug 2019 19:45 )    Color: Yellow / Appearance: Clear / S.025 / pH: x  Gluc: x / Ketone: NEGATIVE  / Bili: NEGATIVE / Urobili: 0.2 E.U./dL   Blood: x / Protein: 30 mg/dL / Nitrite: NEGATIVE   Leuk Esterase: NEGATIVE / RBC: < 5 /HPF / WBC < 5 /HPF   Sq Epi: x / Non Sq Epi: 5-10 /HPF / Bacteria: Present /HPF    RADIOLOGY, EKG & ADDITIONAL TESTS:   CT head negative

## 2019-08-23 NOTE — SBIRT NOTE ADULT - NSSBIRTBRIEFINTDET_GEN_A_CORE
met with patient at bedside due to ETOH referral. Patient reported drinking about 1-2 glasses of wine in the evening. Patient reported that on the weekends she has about 6-8 drinks. Patient stated that alcohol used to be more of an issue for her. Patient stated that at that time she would not know when to stop drinking. Patient reported that her drinking is more in control. Patient's parents think that she should cut back on her alcohol use. Patient declined any resources.

## 2019-08-23 NOTE — DISCHARGE NOTE PROVIDER - NSFOLLOWUPCLINICS_GEN_ALL_ED_FT
[facility]  Family Medicine  [facility]. [address]  [place], [place] [facility]  Phone: [phone]  Fax:   Follow Up Time:

## 2019-08-23 NOTE — H&P ADULT - PROBLEM SELECTOR PLAN 4
Patient with history of depression on Zoloft 150mg.   - continue to f/u as outpatient with therapist   - c/w home medications

## 2019-08-23 NOTE — SBIRT NOTE ADULT - NSSBIRTDRGPOSREINDET_GEN_A_CORE
Patient stated that she used to joul, but quit a few months ago because it was making her more anxious. She stated that her boyfriend passed away suddenly 5 months ago. She started taking lorazepam for her anxiety as prescribed by her psychiatrist. Patient reported also seeing a therapist. Patient reported that outpatient treatment has been beneficial.

## 2019-08-23 NOTE — H&P ADULT - PROBLEM SELECTOR PLAN 2
Patient with history of alcohol abuse. No prior withdrawal episodes.   - current CIWA at zero, no need at this time for CIWA protocol   - closely monitor for signs and symptoms of withdrawal  - Ativan 1mg q4hrs PRN for seizures

## 2019-08-23 NOTE — DISCHARGE NOTE PROVIDER - NSDCFUADDAPPT_GEN_ALL_CORE_FT
You have an appointment with Dr. Do (neurology) to follow up after your recent seizure. Your appointment is scheduled for: 8/27/19 at 3:00pm.    You have an appointment with [facility] to follow up after your discharge and to initiate health care management. You will be contacted in regards to the date and time of your appointment. You have an appointment with Dr. Do (neurology) to follow up after your recent seizure. Your appointment is scheduled for: 8/27/19 at 3:00pm.    You have an appointment with [facility] to follow up after your discharge and to initiate health care management. Your appointment is scheduled for: 9/5/19 at 10:00am.

## 2019-08-23 NOTE — H&P ADULT - ASSESSMENT
24yo F with a PMHx of anxiety (on Lorazepam), depression (on Zoloft), and EtOH abuse who presented to the ED after a witnessed generalized tonic clonic seizure. Admitted to [facility] for post seizure monitoring in setting of EtOH abuse.

## 2019-08-23 NOTE — CONSULT NOTE ADULT - ASSESSMENT
Miss Barger is a 24yo F with a PMHx of anxiety (on Lorazepam), depression (on Zoloft), and EtOH abuse? who presented to the [facility] ED after a witnessed seizure on 8/22/19. Exam was unremarkable. CTH on 8/22 showed no acute pathology. Most likely seizure was provoked from alcohol withdrawal (CIWA 11 on 8/22) or benzodiazepine withdrawal or both. Unprovoked seizures cannot be entirely ruled out, and therefore further diagnostic testing is warranted.    Recommendations:  - Routine vEEG monitoring  - Continue Ativan 0.5mg daily  - Follow-up with therapist regarding tapering Ativan tapering schedule  - Patient counseled on seizure safety precautions such as no driving for 1YR unless seizure free as per [place] law, no climbing ladders, refrain from standing at the edge of the platform at the train station, no swimming or bathing alone.   - MRI Brain w/o contrast with epilepsy protocol to be performed outpatient  - Follow up appointment with Dr Do on September 25 at 9:30am.   - Maintain Seizure and Fall precautions

## 2019-08-27 ENCOUNTER — APPOINTMENT (OUTPATIENT)
Dept: NEUROLOGY | Facility: CLINIC | Age: 24
End: 2019-08-27
Payer: COMMERCIAL

## 2019-08-27 VITALS
OXYGEN SATURATION: 98 % | SYSTOLIC BLOOD PRESSURE: 127 MMHG | BODY MASS INDEX: 24.96 KG/M2 | HEIGHT: 67 IN | DIASTOLIC BLOOD PRESSURE: 89 MMHG | HEART RATE: 79 BPM | WEIGHT: 159 LBS

## 2019-08-27 DIAGNOSIS — F19.239 OTHER PSYCHOACTIVE SUBSTANCE DEPENDENCE WITH WITHDRAWAL, UNSPECIFIED: ICD-10-CM

## 2019-08-27 DIAGNOSIS — R56.9 OTHER PSYCHOACTIVE SUBSTANCE DEPENDENCE WITH WITHDRAWAL, UNSPECIFIED: ICD-10-CM

## 2019-08-27 DIAGNOSIS — G40.501 EPILEPTIC SEIZURES RELATED TO EXTERNAL CAUSES, NOT INTRACTABLE, WITH STATUS EPILEPTICUS: ICD-10-CM

## 2019-08-27 DIAGNOSIS — F32.9 MAJOR DEPRESSIVE DISORDER, SINGLE EPISODE, UNSPECIFIED: ICD-10-CM

## 2019-08-27 DIAGNOSIS — F10.239 ALCOHOL DEPENDENCE WITH WITHDRAWAL, UNSPECIFIED: ICD-10-CM

## 2019-08-27 DIAGNOSIS — Z87.898 PERSONAL HISTORY OF OTHER SPECIFIED CONDITIONS: ICD-10-CM

## 2019-08-27 DIAGNOSIS — F41.9 ANXIETY DISORDER, UNSPECIFIED: ICD-10-CM

## 2019-08-27 DIAGNOSIS — R63.8 OTHER SYMPTOMS AND SIGNS CONCERNING FOOD AND FLUID INTAKE: ICD-10-CM

## 2019-08-27 PROCEDURE — 99215 OFFICE O/P EST HI 40 MIN: CPT

## 2019-09-24 ENCOUNTER — APPOINTMENT (OUTPATIENT)
Dept: INTERNAL MEDICINE | Facility: CLINIC | Age: 24
End: 2019-09-24

## 2019-09-25 ENCOUNTER — APPOINTMENT (OUTPATIENT)
Dept: NEUROLOGY | Facility: CLINIC | Age: 24
End: 2019-09-25

## 2019-09-27 ENCOUNTER — APPOINTMENT (OUTPATIENT)
Dept: NEUROLOGY | Facility: CLINIC | Age: 24
End: 2019-09-27

## 2022-03-20 NOTE — CONSULT NOTE ADULT - CONSULT REASON
Chief Complaint: Shortness of breath    Interval Events: No events overnight.    Review of Systems:  General: No fevers, chills, weight gain  Skin: No rashes, color changes  Cardiovascular: No chest pain, orthopnea  Respiratory: No shortness of breath, cough  Gastrointestinal: No nausea, abdominal pain  Genitourinary: No incontinence, pain with urination  Musculoskeletal: No pain, swelling, decreased range of motion  Neurological: No headache, weakness  Psychiatric: No depression, anxiety  Endocrine: No weight gain, increased thirst  All other systems are comprehensively negative.    Physical Exam:  Vital Signs Last 24 Hrs  T(C): 36.3 (20 Mar 2022 05:45), Max: 36.8 (19 Mar 2022 20:08)  T(F): 97.4 (20 Mar 2022 05:45), Max: 98.2 (19 Mar 2022 20:08)  HR: 60 (20 Mar 2022 06:58) (60 - 73)  BP: 118/73 (20 Mar 2022 05:45) (86/51 - 118/73)  BP(mean): 70 (19 Mar 2022 20:08) (70 - 70)  RR: 17 (20 Mar 2022 05:45) (17 - 18)  SpO2: 96% (20 Mar 2022 06:58) (91% - 99%)  General: NAD  HEENT: MMM  Neck: No JVD, no carotid bruit  Lungs: CTAB  CV: RRR, nl S1/S2, no M/R/G  Abdomen: S/NT/ND, +BS  Extremities: No LE edema, no cyanosis  Neuro: AAOx3, non-focal  Skin: No rash    Labs:    03-20    140  |  101  |  27<H>  ----------------------------<  117<H>  3.7   |  36<H>  |  0.97    Ca    7.9<L>      20 Mar 2022 07:32  Phos  3.6     03-20  Mg     2.4     03-20    TPro  5.2<L>  /  Alb  2.8<L>  /  TBili  0.7  /  DBili  x   /  AST  21  /  ALT  18  /  AlkPhos  116  03-20                        10.1   7.82  )-----------( 171      ( 20 Mar 2022 07:32 )             32.2       Telemetry: AF, ventricular paced Seizure
